# Patient Record
Sex: MALE | Race: WHITE | URBAN - METROPOLITAN AREA
[De-identification: names, ages, dates, MRNs, and addresses within clinical notes are randomized per-mention and may not be internally consistent; named-entity substitution may affect disease eponyms.]

---

## 2022-04-22 ENCOUNTER — OFFICE VISIT (OUTPATIENT)
Dept: URBAN - METROPOLITAN AREA CLINIC 59 | Facility: CLINIC | Age: 67
End: 2022-04-22
Payer: COMMERCIAL

## 2022-04-22 DIAGNOSIS — Z98.890 OTHER SPECIFIED POSTPROCEDURAL STATES: ICD-10-CM

## 2022-04-22 DIAGNOSIS — Z96.1 PRESENCE OF INTRAOCULAR LENS: ICD-10-CM

## 2022-04-22 DIAGNOSIS — H25.13 AGE-RELATED NUCLEAR CATARACT, BILATERAL: ICD-10-CM

## 2022-04-22 DIAGNOSIS — H16.223 KERATOCONJUNCTIVITIS SICCA, BILATERAL: Primary | ICD-10-CM

## 2022-04-22 DIAGNOSIS — H35.371 PUCKERING OF MACULA, RIGHT EYE: ICD-10-CM

## 2022-04-22 DIAGNOSIS — H52.4 PRESBYOPIA: ICD-10-CM

## 2022-04-22 PROCEDURE — 92004 COMPRE OPH EXAM NEW PT 1/>: CPT | Performed by: OPTOMETRIST

## 2022-04-22 PROCEDURE — 92134 CPTRZ OPH DX IMG PST SGM RTA: CPT | Performed by: OPTOMETRIST

## 2022-04-22 ASSESSMENT — INTRAOCULAR PRESSURE
OD: 14
OS: 14

## 2022-04-22 ASSESSMENT — VISUAL ACUITY
OS: 20/125
OD: 20/20

## 2022-04-22 NOTE — IMPRESSION/PLAN
Impression: Puckering of macula, right eye: H35.371. Plan: Baseline OCT (MAC) performed today. Discussed condition in detail with patient. Advised of future/potential treatment options. No treatment is recommended at this time. Will continue to monitor annually. MAC OCT
OD: ERM 
OS: thin retina, irregular surface -S/P PPV, steroid injections.  RPE changes

## 2022-04-22 NOTE — IMPRESSION/PLAN
Impression: Other specified postprocedural states: Z98.890. Plan: S/P PPV/steroid injections OS. Probable macular pucker peel.

## 2022-04-22 NOTE — IMPRESSION/PLAN
Impression: Keratoconjunctivitis sicca, bilateral: D48.916. Plan: Patient to use artificial tears QID OU.

## 2023-04-24 ENCOUNTER — OFFICE VISIT (OUTPATIENT)
Dept: URBAN - METROPOLITAN AREA CLINIC 59 | Facility: CLINIC | Age: 68
End: 2023-04-24
Payer: COMMERCIAL

## 2023-04-24 DIAGNOSIS — H16.223 KERATOCONJUNCTIVITIS SICCA, BILATERAL: ICD-10-CM

## 2023-04-24 DIAGNOSIS — H35.371 PUCKERING OF MACULA, RIGHT EYE: ICD-10-CM

## 2023-04-24 DIAGNOSIS — Z96.1 PRESENCE OF INTRAOCULAR LENS: ICD-10-CM

## 2023-04-24 DIAGNOSIS — H52.4 PRESBYOPIA: Primary | ICD-10-CM

## 2023-04-24 DIAGNOSIS — H25.13 AGE-RELATED NUCLEAR CATARACT, BILATERAL: ICD-10-CM

## 2023-04-24 DIAGNOSIS — Z98.890 OTHER SPECIFIED POSTPROCEDURAL STATES: ICD-10-CM

## 2023-04-24 PROCEDURE — 92014 COMPRE OPH EXAM EST PT 1/>: CPT | Performed by: OPTOMETRIST

## 2023-04-24 ASSESSMENT — INTRAOCULAR PRESSURE
OS: 20
OD: 19

## 2023-04-24 ASSESSMENT — VISUAL ACUITY
OD: 20/20
OS: 20/80

## 2023-09-10 NOTE — IMPRESSION/PLAN
Impression: Age-related nuclear cataract, bilateral: H25.13. Plan: Discussed diagnosis of cataracts with patient. Patient has no significant visual complaints at this time and is able to perform all their daily activities. We will re-evaluate cataract on return visit unless patient notes any changes sooner.
Impression: Keratoconjunctivitis sicca, bilateral: Q44.841. Plan: Patient to use artificial tears QID OU.
Impression: Other specified postprocedural states: Z98.890. Plan: S/P PPV/steroid injections OS. Probable macular pucker peel.
Impression: Presbyopia: H52.4. Plan: New glasses Rx was given today.
Impression: Presence of intraocular lens: Z96.1.  Plan: Well positioned PC IOL OS
S/P YAG OS
Impression: Puckering of macula, right eye: H35.371. Plan: Stable, will monitor.
No

## 2024-04-25 ENCOUNTER — OFFICE VISIT (OUTPATIENT)
Dept: URBAN - METROPOLITAN AREA CLINIC 59 | Facility: CLINIC | Age: 69
End: 2024-04-25
Payer: COMMERCIAL

## 2024-04-25 DIAGNOSIS — Z98.890 OTHER SPECIFIED POSTPROCEDURAL STATES: ICD-10-CM

## 2024-04-25 DIAGNOSIS — H25.13 AGE-RELATED NUCLEAR CATARACT, BILATERAL: ICD-10-CM

## 2024-04-25 DIAGNOSIS — Z96.1 PRESENCE OF INTRAOCULAR LENS: ICD-10-CM

## 2024-04-25 DIAGNOSIS — H35.371 PUCKERING OF MACULA, RIGHT EYE: ICD-10-CM

## 2024-04-25 DIAGNOSIS — H16.223 KERATOCONJUNCTIVITIS SICCA, BILATERAL: ICD-10-CM

## 2024-04-25 DIAGNOSIS — H52.4 PRESBYOPIA: Primary | ICD-10-CM

## 2024-04-25 PROCEDURE — 92014 COMPRE OPH EXAM EST PT 1/>: CPT | Performed by: OPTOMETRIST

## 2024-04-25 ASSESSMENT — VISUAL ACUITY
OD: 20/20
OS: 20/60

## 2024-04-25 ASSESSMENT — INTRAOCULAR PRESSURE
OD: 18
OS: 18

## 2025-05-08 ENCOUNTER — OFFICE VISIT (OUTPATIENT)
Dept: URBAN - METROPOLITAN AREA CLINIC 59 | Facility: CLINIC | Age: 70
End: 2025-05-08
Payer: COMMERCIAL

## 2025-05-08 DIAGNOSIS — H35.371 PUCKERING OF MACULA, RIGHT EYE: ICD-10-CM

## 2025-05-08 DIAGNOSIS — H52.4 PRESBYOPIA: Primary | ICD-10-CM

## 2025-05-08 DIAGNOSIS — H25.11 AGE-RELATED NUCLEAR CATARACT, RIGHT EYE: ICD-10-CM

## 2025-05-08 DIAGNOSIS — Z96.1 PRESENCE OF INTRAOCULAR LENS: ICD-10-CM

## 2025-05-08 DIAGNOSIS — Z98.890 OTHER SPECIFIED POSTPROCEDURAL STATES: ICD-10-CM

## 2025-05-08 PROCEDURE — 92014 COMPRE OPH EXAM EST PT 1/>: CPT | Performed by: OPTOMETRIST

## 2025-05-08 ASSESSMENT — INTRAOCULAR PRESSURE
OD: 16
OS: 14

## 2025-05-08 ASSESSMENT — VISUAL ACUITY
OD: 20/20
OS: 20/60